# Patient Record
Sex: MALE | Race: WHITE | Employment: FULL TIME | URBAN - METROPOLITAN AREA
[De-identification: names, ages, dates, MRNs, and addresses within clinical notes are randomized per-mention and may not be internally consistent; named-entity substitution may affect disease eponyms.]

---

## 2021-09-16 ENCOUNTER — TELEPHONE (OUTPATIENT)
Dept: FAMILY MEDICINE CLINIC | Age: 61
End: 2021-09-16

## 2021-09-16 NOTE — TELEPHONE ENCOUNTER
----- Message from [facility] sent at 9/16/2021 11:40 AM EDT -----  Regarding: /Telephone  Appointment not available    Caller's first and last name and relationship to patient (if not the patient):NA      Best contact number:[phone]      Preferred date and time:First available before scheduled      Scheduled appointment date and time:10/01/2021 3:05      Reason for appointment:NP      Details to clarify the request:Wants to know if the ctscan will be done on that.       [facility]